# Patient Record
(demographics unavailable — no encounter records)

---

## 2025-04-17 NOTE — ASSESSMENT
[FreeTextEntry1] : Discussed again with patient severity of arthritis and treatment options he elects cortisone injection at this time on the right he understands that ultimately persistent symptoms consideration to shoulder arthroplasty

## 2025-04-17 NOTE — PROCEDURE
[de-identified] : Patient has demonstrated limited relief from NSAIDS, rest, exercises / PT, and after discussion of the risks and benefits, the patient has elected to proceed with a corticosteroid injection into the RIGHT shoulder via Posterolateral site. Confirmed that the patient does not have history of prior adverse reactions, active, infections, or relevant allergies.   There was no erythema or warmth, and the skin was clear.  The skin was sterilized with alcohol and via sterile technique, the shoulder was injected with 3 cc of 1% xylocaine and 40 mg of Kenalog.  The injection was completed without complication and a bandage was applied.  The patient tolerated the procedure well and was given post-injection instructions.

## 2025-04-17 NOTE — PHYSICAL EXAM
[de-identified] : Right shoulder  Constitutional: The patient is healthy-appearing and in no apparent distress.   Cardiovascular System:  The capillary refill is less than 2 seconds.   Skin:  There are no skin abnormalities.  C-Spine/Neck:  Active Range of Motion: Flexion				50 Extension			40 Lateral rotation			80    Right shoulder Inspection:  There is no atrophy, erythema, warmth, swelling. There is no scapular winging. There is no AC prominence.   Bony Palpation:  There is no tenderness of the clavicle. There is no tenderness of the acromioclavicular joint. There is no tenderness of the greater tuberosity.  There is no tenderness of the bicipital groove.   Soft Tissue Palpation:  There is tenderness of the trapezius. There is tenderness of the rhomboid. There is no tenderness of the subacromial bursa.   Active Range of Motion:  Forward flexion- 				120  Abduction-					100 External rotation at 0 degrees abduction-	50  Internal rotation at 0 degrees abduction-	80  Passive Range of Motion:  Forward flexion- 			130  Abduction-				150 External rotation at 0 deg abduction-	60  Internal rotation at 0 deg abduction-	80  Special Tests:  Hawkin's  				Negative  Neer's  				Negative Speed's  				Negative AC cross-over 			            Negative Comstock's  				Negative  Stability:  There is no general laxity.   Psychiatric:  The patient demonstrates a normal mood and affect and is active and alert    Special Tests:  Hawkin's  				Positive  Neer's  				Positive  Speed's  				Negative AC cross-over 			            Negative Comstock's  				Negative  Stability:  There is no general laxity.  There is no anterior apprehension.  Strength:  Supraspinatus abduction 		5/5 External rotation at 0 deg abduction 	5/5 Internal rotation at 0 deg abduction	5/5 Scapular elevation 			5/5   Neurological System:   There is normal sensation to light touch C5-T1.   Stability:  There is no general laxity.